# Patient Record
Sex: FEMALE | Race: WHITE | Employment: STUDENT | URBAN - NONMETROPOLITAN AREA
[De-identification: names, ages, dates, MRNs, and addresses within clinical notes are randomized per-mention and may not be internally consistent; named-entity substitution may affect disease eponyms.]

---
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
## 2023-07-11 ENCOUNTER — HOSPITAL ENCOUNTER (EMERGENCY)
Facility: HOSPITAL | Age: 18
Discharge: HOME | End: 2023-07-11
Attending: NURSE PRACTITIONER | Admitting: NURSE PRACTITIONER
Payer: COMMERCIAL

## 2023-07-11 VITALS
HEART RATE: 85 BPM | TEMPERATURE: 97.6 F | BODY MASS INDEX: 27.11 KG/M2 | DIASTOLIC BLOOD PRESSURE: 81 MMHG | OXYGEN SATURATION: 97 % | RESPIRATION RATE: 16 BRPM | WEIGHT: 134.48 LBS | SYSTOLIC BLOOD PRESSURE: 132 MMHG | HEIGHT: 59 IN

## 2023-07-11 DIAGNOSIS — R12 HEARTBURN: ICD-10-CM

## 2023-07-11 PROCEDURE — G0463 HOSPITAL OUTPT CLINIC VISIT: HCPCS

## 2023-07-11 PROCEDURE — 99213 OFFICE O/P EST LOW 20 MIN: CPT | Performed by: NURSE PRACTITIONER

## 2023-07-11 ASSESSMENT — ENCOUNTER SYMPTOMS
HEADACHES: 0
CHILLS: 0
PSYCHIATRIC NEGATIVE: 1
VOMITING: 0
NAUSEA: 0
SHORTNESS OF BREATH: 0
ABDOMINAL PAIN: 0
PALPITATIONS: 0
NECK STIFFNESS: 0
NECK PAIN: 0
DIARRHEA: 0
FEVER: 0
TROUBLE SWALLOWING: 0
DIZZINESS: 0
WEAKNESS: 0

## 2023-07-11 NOTE — ED TRIAGE NOTES
Patient presents to urgent care for possible burning sensation after she eats or drinks for about a week. Patient is currently taking penicillin for a root canal. Patient states this is the first time this has happened. Patient has taken an acid reducer without relief, she also took ibuprofen without relief.    Patient is not pregnant.

## 2023-07-11 NOTE — DISCHARGE INSTRUCTIONS
May attempt over-the-counter Maalox or Pepcid as needed    Follow-up with primary care provider or return to urgent care/ED with any worsening in condition or additional concerns.

## 2023-07-11 NOTE — ED PROVIDER NOTES
History     Chief Complaint   Patient presents with     Heartburn     HPI  Francine Garcia is a 18 year old female who presents to urgent care today ambulatory accompanied by mother with complaints of a burning sensation in her chest after she eats or drinks.  Onset was after starting penicillin for root canal, denies any dental pain.  Not currently pregnant.  No history of heartburn.  Denies any history of cardiac issues or heart palpitations, declines any cardiac workup.  No recent fall, injury or trauma.  Denies any fever, chills, nausea, vomiting, diarrhea, shortness of breath or chest pain.  Denies any abdominal pain.  No other concerns.    Allergies:  No Known Allergies    Problem List:    There are no problems to display for this patient.       Past Medical History:    No past medical history on file.    Past Surgical History:    No past surgical history on file.    Family History:    Family History   Problem Relation Age of Onset     Hypertension Mother      Breast Cancer Maternal Grandmother      Hypertension Maternal Grandfather      Diabetes Paternal Grandfather         pre-diabetes     Thyroid Disease No family hx of      Asthma No family hx of        Social History:  Marital Status:  Single [1]  Social History     Tobacco Use     Smoking status: Never     Smokeless tobacco: Never   Substance Use Topics     Alcohol use: No     Alcohol/week: 0.0 standard drinks of alcohol     Drug use: No        Medications:    norethindrone-ethinyl estradiol (MICROGESTIN) 1.5-30 MG-MCG tablet      Review of Systems   Constitutional: Negative for chills and fever.   HENT: Negative for trouble swallowing.    Respiratory: Negative for shortness of breath.    Cardiovascular: Negative for palpitations.        Epigastric pain   Gastrointestinal: Negative for abdominal pain, diarrhea, nausea and vomiting.   Musculoskeletal: Negative for gait problem, neck pain and neck stiffness.   Skin: Negative.    Neurological: Negative for  "dizziness, weakness and headaches.   Psychiatric/Behavioral: Negative.      Physical Exam   BP: 132/81  Pulse: 85  Temp: 97.6  F (36.4  C)  Resp: 16  Height: 149.9 cm (4' 11\")  Weight: 61 kg (134 lb 7.7 oz)  SpO2: 97 %    Physical Exam  Vitals and nursing note reviewed.   Constitutional:       General: She is not in acute distress.     Appearance: Normal appearance. She is not ill-appearing or toxic-appearing.   Cardiovascular:      Rate and Rhythm: Normal rate and regular rhythm.      Pulses: Normal pulses.      Heart sounds: Normal heart sounds.   Pulmonary:      Effort: Pulmonary effort is normal.      Breath sounds: Normal breath sounds.   Abdominal:      General: Bowel sounds are normal.      Palpations: Abdomen is soft.      Tenderness: There is no abdominal tenderness.   Neurological:      Mental Status: She is alert.   Psychiatric:         Mood and Affect: Mood normal.       ED Course     No results found for this or any previous visit (from the past 24 hour(s)).    Medications - No data to display    Assessments & Plan (with Medical Decision Making)     I have reviewed the nursing notes.    I have reviewed the findings, diagnosis, plan and need for follow up with the patient.  (R12) Heartburn  Plan:   Patient ambulatory with a nontoxic appearance.  Patient started to experience epigastric pain after meals since starting penicillin.  Patient only has 1 dose of penicillin left for a root canal, denies any dental issues.  Denies any heart palpitations, declines any cardiac work-up today.  Declines any lab or imaging today.  Denies any abdominal pain, no tenderness.  Mother has previous issues with heartburn while on antibiotics.  Patient not currently pregnant.  Symptoms consistent with heartburn while using an antibiotic, patient to monitor after stopping penicillin if heartburn improves and goes away.  Denies any current heartburn.  Attempted Pepto-Bismol without relief.  Patient to attempt over-the-counter " Maalox or Pepcid as needed.  Follow-up with primary care provider or return to urgent care/ED with any worsening in condition or additional concerns.  Patient is in agreement with treatment plan.    New Prescriptions    No medications on file     Final diagnoses:   Heartburn     7/11/2023   [facility]     Maeve Chaves, HIMA  07/11/23 1234

## 2023-07-23 ENCOUNTER — HEALTH MAINTENANCE LETTER (OUTPATIENT)
Age: 18
End: 2023-07-23

## 2023-12-13 NOTE — PROGRESS NOTES
"  Assessment & Plan     Encounter for surveillance of contraceptive pills  Working well - consistent - no side effects - desires to continue.  Refilled.  Not sexually active yet - defer STD screening.  Reminded to schedule physical - pap due 21.  - norethindrone-ethinyl estradiol (MICROGESTIN) 1.5-30 MG-MCG tablet; Take 1 tablet by mouth daily       BMI:   Estimated body mass index is 28.82 kg/m  as calculated from the following:    Height as of 7/11/23: 1.499 m (4' 11\").    Weight as of this encounter: 64.7 kg (142 lb 11.2 oz).       See Patient Instructions    Return in about 1 year (around 12/18/2024) for annual physical.    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   Working part time dairy farm here.    Medication Followup of Norethindrone- ethinyl estradiol (Microgestin)  Taking Medication as prescribed: yes  Side Effects:  None  Medication Helping Symptoms:  yes  Consistent with taking  No side effects  Monthly menses - lasting 4-5 days; not heavy; no clots; some cramping  Not sexually active - never have        Review of Systems   Constitutional, HEENT, cardiovascular, pulmonary, gi and gu systems are negative, except as otherwise noted.      Objective    /75 (BP Location: Right arm, Patient Position: Sitting, Cuff Size: Adult Regular)   Pulse 67   Temp 97.2  F (36.2  C) (Tympanic)   Wt 64.7 kg (142 lb 11.2 oz)   LMP 12/11/2023 (Exact Date)   SpO2 98%   BMI 28.82 " kg/m    Body mass index is 28.82 kg/m .  Physical Exam   GENERAL: healthy, alert and no distress  NECK: no adenopathy, no asymmetry, masses, or scars and thyroid normal to palpation  RESP: lungs clear to auscultation - no rales, rhonchi or wheezes  CV: regular rate and rhythm, normal S1 S2, no S3 or S4, no murmur, click or rub, no peripheral edema and peripheral pulses strong  ABDOMEN: soft, nontender, no hepatosplenomegaly, no masses and bowel sounds normal  MS: no gross musculoskeletal defects noted, no edema  PSYCH: mentation appears normal, affect normal/bright

## 2023-12-15 DIAGNOSIS — Z30.41 ENCOUNTER FOR SURVEILLANCE OF CONTRACEPTIVE PILLS: ICD-10-CM

## 2023-12-15 NOTE — TELEPHONE ENCOUNTER
Microgestin      Last Written Prescription Date:  10/28/22  Last Fill Quantity: 84,   # refills: 2  Last Office Visit: 10/28/22  Future Office visit:    Next 5 appointments (look out 90 days)      Dec 18, 2023  9:45 AM  (Arrive by 9:30 AM)  SHORT with Karrie Toor MD  [facility] [place] ([facility] [place] ) [address]  [place] [address]  [phone]             Routing refill request to provider for review/approval because:

## 2023-12-18 ENCOUNTER — OFFICE VISIT (OUTPATIENT)
Dept: FAMILY MEDICINE | Facility: OTHER | Age: 18
End: 2023-12-18
Attending: FAMILY MEDICINE
Payer: COMMERCIAL

## 2023-12-18 VITALS
OXYGEN SATURATION: 98 % | TEMPERATURE: 97.2 F | SYSTOLIC BLOOD PRESSURE: 123 MMHG | BODY MASS INDEX: 28.82 KG/M2 | HEART RATE: 67 BPM | WEIGHT: 142.7 LBS | DIASTOLIC BLOOD PRESSURE: 75 MMHG

## 2023-12-18 DIAGNOSIS — Z30.41 ENCOUNTER FOR SURVEILLANCE OF CONTRACEPTIVE PILLS: ICD-10-CM

## 2023-12-18 PROCEDURE — G0463 HOSPITAL OUTPT CLINIC VISIT: HCPCS

## 2023-12-18 PROCEDURE — 99213 OFFICE O/P EST LOW 20 MIN: CPT | Performed by: FAMILY MEDICINE

## 2023-12-18 RX ORDER — NORETHINDRONE ACETATE AND ETHINYL ESTRADIOL .03; 1.5 MG/1; MG/1
1 TABLET ORAL DAILY
Qty: 84 TABLET | Refills: 3 | Status: SHIPPED | OUTPATIENT
Start: 2023-12-18

## 2023-12-18 RX ORDER — NORETHINDRONE ACETATE AND ETHINYL ESTRADIOL 1.5; 3 MG/1; UG/1
1 TABLET ORAL DAILY
Qty: 84 TABLET | Refills: 0 | OUTPATIENT
Start: 2023-12-18

## 2023-12-18 ASSESSMENT — PAIN SCALES - GENERAL: PAINLEVEL: NO PAIN (0)

## 2024-08-02 ENCOUNTER — TELEPHONE (OUTPATIENT)
Dept: FAMILY MEDICINE | Facility: OTHER | Age: 19
End: 2024-08-02

## 2024-09-14 ENCOUNTER — HEALTH MAINTENANCE LETTER (OUTPATIENT)
Age: 19
End: 2024-09-14

## 2024-11-20 ENCOUNTER — OFFICE VISIT (OUTPATIENT)
Dept: FAMILY MEDICINE | Facility: OTHER | Age: 19
End: 2024-11-20
Attending: FAMILY MEDICINE
Payer: COMMERCIAL

## 2024-11-20 VITALS
TEMPERATURE: 98.8 F | OXYGEN SATURATION: 98 % | SYSTOLIC BLOOD PRESSURE: 118 MMHG | WEIGHT: 145.9 LBS | HEART RATE: 82 BPM | RESPIRATION RATE: 16 BRPM | DIASTOLIC BLOOD PRESSURE: 74 MMHG | BODY MASS INDEX: 29.41 KG/M2 | HEIGHT: 59 IN

## 2024-11-20 DIAGNOSIS — R09.89 BRUIT OF LEFT CAROTID ARTERY: ICD-10-CM

## 2024-11-20 DIAGNOSIS — Z12.4 CERVICAL CANCER SCREENING: ICD-10-CM

## 2024-11-20 DIAGNOSIS — Z00.00 ROUTINE GENERAL MEDICAL EXAMINATION AT A HEALTH CARE FACILITY: Primary | ICD-10-CM

## 2024-11-20 DIAGNOSIS — Z30.41 ENCOUNTER FOR SURVEILLANCE OF CONTRACEPTIVE PILLS: ICD-10-CM

## 2024-11-20 DIAGNOSIS — R01.1 HEART MURMUR: ICD-10-CM

## 2024-11-20 RX ORDER — NORETHINDRONE ACETATE AND ETHINYL ESTRADIOL .03; 1.5 MG/1; MG/1
1 TABLET ORAL DAILY
Qty: 84 TABLET | Refills: 3 | Status: SHIPPED | OUTPATIENT
Start: 2024-11-20

## 2024-11-20 SDOH — HEALTH STABILITY: PHYSICAL HEALTH: ON AVERAGE, HOW MANY MINUTES DO YOU ENGAGE IN EXERCISE AT THIS LEVEL?: 60 MIN

## 2024-11-20 SDOH — HEALTH STABILITY: PHYSICAL HEALTH: ON AVERAGE, HOW MANY DAYS PER WEEK DO YOU ENGAGE IN MODERATE TO STRENUOUS EXERCISE (LIKE A BRISK WALK)?: 7 DAYS

## 2024-11-20 ASSESSMENT — PAIN SCALES - GENERAL: PAINLEVEL_OUTOF10: NO PAIN (0)

## 2024-11-20 ASSESSMENT — SOCIAL DETERMINANTS OF HEALTH (SDOH): HOW OFTEN DO YOU GET TOGETHER WITH FRIENDS OR RELATIVES?: ONCE A WEEK

## 2024-11-20 NOTE — PATIENT INSTRUCTIONS
"  Consider vaccines - covid, flu, gardisil.    Echo and carotid ultrasound to further evaluate neck vessels and heart - could hear some noise \"bruit\" today.    Continue birth control - refills sent.  Pap done -will notify of results.    Patient Education   Preventive Care Advice   This is general advice given by our system to help you stay healthy. However, your care team may have specific advice just for you. Please talk to your care team about your preventive care needs.  Nutrition  Eat 5 or more servings of fruits and vegetables each day.  Try wheat bread, brown rice and whole grain pasta (instead of white bread, rice, and pasta).  Get enough calcium and vitamin D. Check the label on foods and aim for 100% of the RDA (recommended daily allowance).  Lifestyle  Exercise at least 150 minutes each week  (30 minutes a day, 5 days a week).  Do muscle strengthening activities 2 days a week. These help control your weight and prevent disease.  No smoking.  Wear sunscreen to prevent skin cancer.  Have a dental exam and cleaning every 6 months.  Yearly exams  See your health care team every year to talk about:  Any changes in your health.  Any medicines your care team has prescribed.  Preventive care, family planning, and ways to prevent chronic diseases.  Shots (vaccines)   HPV shots (up to age 26), if you've never had them before.  Hepatitis B shots (up to age 59), if you've never had them before.  COVID-19 shot: Get this shot when it's due.  Flu shot: Get a flu shot every year.  Tetanus shot: Get a tetanus shot every 10 years.  Pneumococcal, hepatitis A, and RSV shots: Ask your care team if you need these based on your risk.  Shingles shot (for age 50 and up)  General health tests  Diabetes screening:  Starting at age 35, Get screened for diabetes at least every 3 years.  If you are younger than age 35, ask your care team if you should be screened for diabetes.  Cholesterol test: At age 39, start having a cholesterol " test every 5 years, or more often if advised.  Bone density scan (DEXA): At age 50, ask your care team if you should have this scan for osteoporosis (brittle bones).  Hepatitis C: Get tested at least once in your life.  STIs (sexually transmitted infections)  Before age 24: Ask your care team if you should be screened for STIs.  After age 24: Get screened for STIs if you're at risk. You are at risk for STIs (including HIV) if:  You are sexually active with more than one person.  You don't use condoms every time.  You or a partner was diagnosed with a sexually transmitted infection.  If you are at risk for HIV, ask about PrEP medicine to prevent HIV.  Get tested for HIV at least once in your life, whether you are at risk for HIV or not.  Cancer screening tests  Cervical cancer screening: If you have a cervix, begin getting regular cervical cancer screening tests starting at age 21.  Breast cancer scan (mammogram): If you've ever had breasts, begin having regular mammograms starting at age 40. This is a scan to check for breast cancer.  Colon cancer screening: It is important to start screening for colon cancer at age 45.  Have a colonoscopy test every 10 years (or more often if you're at risk) Or, ask your provider about stool tests like a FIT test every year or Cologuard test every 3 years.  To learn more about your testing options, visit:   .  For help making a decision, visit:   https://bit.ly/ju70644.  Prostate cancer screening test: If you have a prostate, ask your care team if a prostate cancer screening test (PSA) at age 55 is right for you.  Lung cancer screening: If you are a current or former smoker ages 50 to 80, ask your care team if ongoing lung cancer screenings are right for you.  For informational purposes only. Not to replace the advice of your health care provider. Copyright   2023 [employer]. All rights reserved. Clinically reviewed by the [facility] Transitions Program.  [place] [id number] - REV 01/24.

## 2024-11-20 NOTE — PROGRESS NOTES
"Preventive Care Visit  [facility]  Karrie Riojas MD, Family Medicine  Nov 20, 2024      Assessment & Plan     Routine general medical examination at a health care facility  Preventative cares reviewed.  Vaccines declined.  Counseled on  healthy diet, exercise.    Encounter for surveillance of contraceptive pills  Stable.  Refill done.  Not sexually active.  Defer std screen.  - norethindrone-ethinyl estradiol (MICROGESTIN) 1.5-30 MG-MCG tablet; Take 1 tablet by mouth daily.    Cervical cancer screening  Pap obtained.  - Gynecologic Cytology (PAP Smear); Future    Bruit of left carotid artery  Left bruit noted - not right, but faint murmur as well?  Obtain US carotids and echo  - US Carotid Bilateral; Future    Heart murmur  As above  - Echocardiogram Complete; Future        BMI  Estimated body mass index is 29.47 kg/m  as calculated from the following:    Height as of this encounter: 1.499 m (4' 11\").    Weight as of this encounter: 66.2 kg (145 lb 14.4 oz).   Weight management plan: Discussed healthy diet and exercise guidelines    Counseling  Appropriate preventive services were addressed with this patient via screening, questionnaire, or discussion as appropriate for fall prevention, nutrition, physical activity, Tobacco-use cessation, social engagement, weight loss and cognition.  Checklist reviewing preventive services available has been given to the patient.  Reviewed patient's diet, addressing concerns and/or questions.   She is at risk for psychosocial distress and has been provided with information to reduce risk.       See Patient Instructions    Return in about 53 weeks (around 11/26/2025) for Annual Wellness Visit.    Sanjiv Escamilla is a 19 year old, presenting for the following:  Physical (Annual Visit) and Gyn Exam (PAP)        11/20/2024     3:56 PM   Additional Questions   Roomed by Omi Mercado LPN   Accompanied by Self         11/20/2024     3:56 PM   Patient Reported " Additional Medications   Patient reports taking the following new medications None          History of Present Illness       Reason for visit:  Prescription renewal and Physical   She is taking medications regularly.      Vaccines - flu, covid - declines    No sexually active.  STD screen - not needed  Sexually active - no  Oral contraceptive - taking regularly, no side effects; desires to continue  Menses - regular, normal flow  No vaginal concerns.    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  Mon - Friday job.  Variable activity.  Exercise - taking care of animals - over an hour per day      Health Care Directive  Patient does not have a Health Care Directive: Discussed advance care planning with patient; however, patient declined at this time.      11/20/2024   General Health   How would you rate your overall physical health? Good   Feel stress (tense, anxious, or unable to sleep) To some extent      (!) STRESS CONCERN      11/20/2024   Nutrition   Three or more servings of calcium each day? Yes   Diet: Regular (no restrictions)   How many servings of fruit and vegetables per day? (!) 2-3   How many sweetened beverages each day? (!) 2            11/20/2024   Exercise   Days per week of moderate/strenous exercise 7 days   Average minutes spent exercising at this level 60 min            11/20/2024   Social Factors   Frequency of gathering with friends or relatives Once a week   Worry food won't last until get money to buy more No   Food not last or not have enough money for food? No   Do you have housing? (Housing is defined as stable permanent housing and does not include staying ouside in a car, in a tent, in an abandoned building, in an overnight shelter, or couch-surfing.) Yes   Are you worried about losing your housing? No   Lack of transportation? No   Unable to get utilities (heat,electricity)? No            11/20/2024   Dental  "  Dentist two times every year? Yes            11/20/2024   TB Screening   Were you born outside of the [place]? No            Today's PHQ-2 Score:       11/20/2024     3:27 PM   PHQ-2 ( 1999 [employer])   Q1: Little interest or pleasure in doing things 1    Q2: Feeling down, depressed or hopeless 1    PHQ-2 Score 2    Q1: Little interest or pleasure in doing things Several days   Q2: Feeling down, depressed or hopeless Several days   PHQ-2 Score 2       Patient-reported           11/20/2024   Substance Use   Alcohol more than 3/day or more than 7/wk No   Do you use any other substances recreationally? No        Social History     Tobacco Use    Smoking status: Never     Passive exposure: Never    Smokeless tobacco: Never   Vaping Use    Vaping status: Never Used   Substance Use Topics    Alcohol use: No    Drug use: No             11/20/2024   One time HIV Screening   Previous HIV test? I don't know          11/20/2024   STI Screening   New sexual partner(s) since last STI/HIV test? No        History of abnormal Pap smear: No - age 21-29 PAP every 3 years recommended             11/20/2024   Contraception/Family Planning   Questions about contraception or family planning No           Reviewed and updated as needed this visit by Provider                    No past medical history on file.  No past surgical history on file.      Review of Systems  Constitutional, HEENT, cardiovascular, pulmonary, gi and gu systems are negative, except as otherwise noted.     Objective    Exam  /74   Pulse 82   Temp 98.8  F (37.1  C) (Tympanic)   Resp 16   Ht 1.499 m (4' 11\")   Wt 66.2 kg (145 lb 14.4 oz)   LMP 11/12/2024 (Exact Date)   SpO2 98%   BMI 29.47 kg/m     Estimated body mass index is 29.47 kg/m  as calculated from the following:    Height as of this encounter: 1.499 m (4' 11\").    Weight as of this encounter: 66.2 kg (145 lb 14.4 oz).    Physical Exam  GENERAL: alert and no distress  EYES: Eyes grossly normal to " inspection, PERRL and conjunctivae and sclerae normal  HENT: ear canals and TM's normal, nose and mouth without ulcers or lesions  NECK: no adenopathy, no asymmetry, masses, or scars  RESP: lungs clear to auscultation - no rales, rhonchi or wheezes  BREAST: normal without masses, tenderness or nipple discharge and no palpable axillary masses or adenopathy  CV: regular rates and rhythm, normal S1 S2, no S3 or S4, grade 1/6 systolic murmur,  peripheral pulses strong, no peripheral edema, and bruit heard left  ABDOMEN: soft, nontender, no hepatosplenomegaly, no masses and bowel sounds normal   (female): normal female external genitalia, normal urethral meatus, normal vaginal mucosa  MS: no gross musculoskeletal defects noted, no edema  SKIN: no suspicious lesions or rashes  NEURO: Normal strength and tone, mentation intact and speech normal  PSYCH: mentation appears normal, affect normal/bright        Signed Electronically by: Karrie Riojas MD

## 2024-11-26 ENCOUNTER — HOSPITAL ENCOUNTER (OUTPATIENT)
Dept: ULTRASOUND IMAGING | Facility: HOSPITAL | Age: 19
Discharge: HOME | End: 2024-11-26
Attending: FAMILY MEDICINE
Payer: COMMERCIAL

## 2024-11-26 ENCOUNTER — HOSPITAL ENCOUNTER (OUTPATIENT)
Dept: CARDIOLOGY | Facility: HOSPITAL | Age: 19
Discharge: HOME | End: 2024-11-26
Attending: FAMILY MEDICINE
Payer: COMMERCIAL

## 2024-11-26 DIAGNOSIS — R01.1 HEART MURMUR: ICD-10-CM

## 2024-11-26 DIAGNOSIS — R09.89 BRUIT OF LEFT CAROTID ARTERY: ICD-10-CM

## 2024-11-26 LAB
BKR LAB AP GYN ADEQUACY: NORMAL
BKR LAB AP GYN INTERPRETATION: NORMAL
BKR LAB AP HPV REFLEX: NO
BKR LAB AP PREVIOUS ABNORMAL: NORMAL
LVEF ECHO: NORMAL
PATH REPORT.COMMENTS IMP SPEC: NORMAL
PATH REPORT.COMMENTS IMP SPEC: NORMAL
PATH REPORT.RELEVANT HX SPEC: NORMAL

## 2024-11-26 PROCEDURE — 93306 TTE W/DOPPLER COMPLETE: CPT

## 2024-11-26 PROCEDURE — 93880 EXTRACRANIAL BILAT STUDY: CPT

## 2024-12-04 ENCOUNTER — HOSPITAL ENCOUNTER (OUTPATIENT)
Dept: CT IMAGING | Facility: HOSPITAL | Age: 19
Discharge: HOME | End: 2024-12-04
Attending: FAMILY MEDICINE
Payer: COMMERCIAL

## 2024-12-04 DIAGNOSIS — R09.89 BRUIT OF LEFT CAROTID ARTERY: ICD-10-CM

## 2024-12-04 DIAGNOSIS — R93.89 ABNORMAL CAROTID ULTRASOUND: ICD-10-CM

## 2024-12-04 PROCEDURE — 70496 CT ANGIOGRAPHY HEAD: CPT

## 2024-12-04 PROCEDURE — 250N000011 HC RX IP 250 OP 636: Performed by: RADIOLOGY

## 2024-12-04 RX ORDER — IOPAMIDOL 755 MG/ML
67 INJECTION, SOLUTION INTRAVASCULAR ONCE
Status: DISCONTINUED | OUTPATIENT
Start: 2024-12-04 | End: 2024-12-05 | Stop reason: HOSPADM

## 2024-12-04 RX ORDER — IOPAMIDOL 755 MG/ML
67 INJECTION, SOLUTION INTRAVASCULAR ONCE
Status: COMPLETED | OUTPATIENT
Start: 2024-12-04 | End: 2024-12-04

## 2024-12-04 RX ADMIN — IOPAMIDOL 67 ML: 755 INJECTION, SOLUTION INTRAVENOUS at 14:03

## 2024-12-11 ENCOUNTER — TELEPHONE (OUTPATIENT)
Dept: FAMILY MEDICINE | Facility: OTHER | Age: 19
End: 2024-12-11

## 2024-12-12 NOTE — TELEPHONE ENCOUNTER
Called Bety Chaves RN supervisor, vascular surgery [facility]. [phone]   Staff transferred me to her voicemail as she was with a patient.  Left voicemail to return my call regarding this patient and whether formal referral for consult needed.  Had faxed CTA twice to [phone].  Awaiting call back.

## 2024-12-18 ENCOUNTER — TELEPHONE (OUTPATIENT)
Dept: FAMILY MEDICINE | Facility: OTHER | Age: 19
End: 2024-12-18

## 2024-12-18 NOTE — TELEPHONE ENCOUNTER
Notify patient -     Received a call back from [facility], JOAN Albert, today.    Dr Gross reviewed CTA.    States no need for specific consult.    Only if having dysphagia - difficulties with swallowing.    Otherwise, they are happy to see her in consultation if she does want to review imaging and ask any questions.

## 2024-12-18 NOTE — TELEPHONE ENCOUNTER
Spoke with patient, she does not want to be seen at this time. If she changes her mind she will notify us.